# Patient Record
(demographics unavailable — no encounter records)

---

## 2024-10-15 NOTE — PHYSICAL EXAM
[General Appearance - Alert] : alert [General Appearance - In No Acute Distress] : in no acute distress [Oriented To Time, Place, And Person] : oriented to person, place, and time [Mood] : the mood was normal [Person] : oriented to person [Place] : oriented to place [Time] : oriented to time [Registration Intact] : recent registration memory intact [Concentration Intact] : normal concentrating ability [Naming Objects] : no difficulty naming common objects [Repeating Phrases] : no difficulty repeating a phrase [Fluency] : fluency intact [Comprehension] : comprehension intact [Cranial Nerves Optic (II)] : visual acuity intact bilaterally,  visual fields full to confrontation, pupils equal round and reactive to light [Cranial Nerves Oculomotor (III)] : extraocular motion intact [Cranial Nerves Trigeminal (V)] : facial sensation intact symmetrically [Cranial Nerves Facial (VII)] : face symmetrical [Cranial Nerves Vestibulocochlear (VIII)] : hearing was intact bilaterally [Cranial Nerves Glossopharyngeal (IX)] : tongue and palate midline [Cranial Nerves Accessory (XI - Cranial And Spinal)] : head turning and shoulder shrug symmetric [Cranial Nerves Hypoglossal (XII)] : there was no tongue deviation with protrusion [Motor Strength] : muscle strength was normal in all four extremities [Pain / Temp Decrease Distal Extremities (Glove & Stocking)] : diminished stocking/glove distribution [Vibration Decrease Leg / Foot Both Ankles] : decreased at both ankles [Vibration Decrease Leg / Foot Toes Both Feet] : decreased at the toes of both feet  [2+] : Brachioradialis left 2+ [1+] : Patella left 1+ [0] : Ankle jerk left 0 [Coordination - Dysmetria Impaired Finger-to-Nose Bilateral] : not present [Plantar Reflex Right Only] : normal on the right [Plantar Reflex Left Only] : normal on the left [FreeTextEntry1] : Facial expression was mildly reduced but no hypomimia, EOMI - normal saccades, smooth pursuit.  Tone mildly increased in upper > lower extremities. no bradykinesias, Finger tapping, ANGELA foot tap normal.    Able to pull up from the chair without using his arms.  Gait broadbased with decreased right arm swing  Posture was mildly stooped. .  He had mild bilateral postural and intention tremor of his hands. Slight tremor of both thighs while standing, resolves as soon as he started walking. [FreeTextEntry7] : Romberg: sway without falling [FreeTextEntry8] : Balance: Stooped posture, tilts to left, decreased left shoulder slope, unable to walk on heels and tandem.

## 2024-10-15 NOTE — DISCUSSION/SUMMARY
[FreeTextEntry1] : 80-year-old M with PMH of HTN, HLD, DVT/PE x 2, is on Coumadin; was diagnosed with peripheral neuropathy ~ two decades ago by Dr. Rascon at [facility], Rx with IVIG X 1, is on gabapentin.   Pt has intermittent tremors of hands for almost two decades, he has been experiencing shakiness tremors of legs when he stands for a period of time, lately his posture is hunched.  #1) Possibility of orthostatic tremor / neuropathic tremors; effecting lower and upper extremities.   #2) Evolution of mild parkinsonian features; evolving to  postural instability - at present exam not c/w PD -  possible Parkinsonian syndrome   - I have recommended restart physical therapy twice weekly - Discussed the option of getting DatScan of brain, would not  as per  movement disorder specialist  #3) Peripheral sensorimotor polyneuropathy; primarily axonal, mild to moderate progression of neuropathy noted since 8/2015, slight change in pattern from axonal to demyelination as well.  - Increase Gabapentin am dose to 300 mgs, continue, 200 mg noon, continue 300 mg q.p.m. - have also advised the patient to take B12 supplements. - Will consider repeating NCV studies in a year to see evolution of neuropathy

## 2024-10-15 NOTE — REVIEW OF SYSTEMS
[As Noted in HPI] : as noted in HPI [Poor Coordination] : poor coordination [Negative] : Heme/Lymph [Feeling Tired] : not feeling tired [Recent Weight Gain (___ Lbs)] : no recent weight gain [Numbness] : numbness [Tingling] : tingling [de-identified] : Postural instability, hunched posture, tremors of hands

## 2024-10-15 NOTE — DATA REVIEWED
[de-identified] : 8/11/22: EMG/NCV studies of right UE/LE's. The electrophysiological findings are consistent with moderate distal sensorimotor motor polyneuropathy, primarily axonal type.  Compared with prior studies of 8/27/2015 mild-moderate progression of neuropathy is noted, in addition to axonal loss, slight demyelinating pattern is noted as well. In addition, studies raise possibility of right L4/5 radiculopathy of chronic nature. Needle EMG studies of lumbar paraspinal muscles were deferred as patient is on warfarin.\par  8/27/15: EMG/NCV studies of right upper and lower extremities\par  This study is abnormal. The electrophysiological findings are consistent with mild to moderate peripheral sensorimotor neuropathy, primarily axonal type.\par  8/27/15: EMG/NCV studies of right upper and lower extremities\par  This study is abnormal. The electrophysiological findings are consistent with mild to moderate peripheral sensorimotor neuropathy, primarily axonal type. [de-identified] : labs: 7/29/15; immunofixation; IgG kappa Monoclonal protein present. Homocystine: 13.6, B12 378\par  TSH, CPK, ESR, ANA PAULA, SS A.-SSB antibodies, RF, CRP, Lyme titers, MMA, ACE, anti-HU antibodies unremarkable

## 2024-10-15 NOTE — HISTORY OF PRESENT ILLNESS
[FreeTextEntry1] : Pt is here for a follow up, last seen on 3/12/24. Patient reports that over the past 3-4 months, he has noted worsening numbness in the feet, in addition he reports tightness and heaviness in the legs when he is standing and walking, he feels unsteady on his feet, but he does report that sometimes his leg symptoms improve when he walks.  He continues to walk with a slightly hunched posture, and wife has noticed that he is tilting to his left side.  He reports he is active, but wife reports he is more sedentary.  Patient had been attending physical therapy, had helped him tremendously with posture and gait.  Patient has remained stable in regard to his tremors, tremors of hands are minimal..  He continues to take gabapentin 200 Mg in AM, 200 Mg and noon and 300 Mg at bedtime.

## 2024-10-15 NOTE — REASON FOR VISIT
[Follow-Up: _____] : a [unfilled] follow-up visit [FreeTextEntry1] : follow-up for Unsteady gait, tremors and neuropathy

## 2025-01-03 NOTE — HISTORY OF PRESENT ILLNESS
[FreeTextEntry1] : The patient is a 80-year-old right-handed man who has had tremor of his legs for many years.    1. Tremors happen when he is standing and go away when he walks.  He does not have any other tremors, particularly no resting tremor of the hands.  While he is standing, he feels that the tremor affects his balance and is afraid of falling.  Stress or fatigue worsen the symptoms. Has fallen in 2022 2. There is no change in his voice, and no changes in facial expression.  He does have mild drooling, but no dysphagia.  He has no trouble turning over in bed.  His handwriting is gotten worse but other activities daily and is unaffected.  Also does have intermittent mild tremor of his hands which he has had for a long time. 3. His memory is good and he has no depression or anxiety.  He has no hallucinations. No RBD. He does not have constipation, orthostasis, or urinary issues.   4. His father had a similar tremor, but there is no family history of Parkinson's disease.  For a while he had been given clonazepam at night, which might help a little bit. 5. Tried levodopa, did not tolerate, and clonazepam caused instablity.  6. He does also have a history of neuropathy which was diagnosed with extensive work-up 20/20 years ago over 20 years ago.  He has been taking gabapentin for this, and it was restarted for the tremor (200-200-300).  At the time he was also told he had essential tremor of his hands, and no medications were prescribed for that.   7. He is on blood thinners because he has a prothrombin mutation.

## 2025-01-03 NOTE — PHYSICAL EXAM
[Person] : oriented to person [Place] : oriented to place [Time] : oriented to time [Concentration Intact] : normal concentrating ability [Naming Objects] : no difficulty naming common objects [Repeating Phrases] : no difficulty repeating a phrase [Fluency] : fluency intact [Comprehension] : comprehension intact [Past History] : adequate knowledge of personal past history [Cranial Nerves Optic (II)] : visual acuity intact bilaterally,  visual fields full to confrontation, pupils equal round and reactive to light [Cranial Nerves Oculomotor (III)] : extraocular motion intact [Cranial Nerves Trigeminal (V)] : facial sensation intact symmetrically [Cranial Nerves Facial (VII)] : face symmetrical [Cranial Nerves Vestibulocochlear (VIII)] : hearing was intact bilaterally [Cranial Nerves Glossopharyngeal (IX)] : tongue and palate midline [Cranial Nerves Accessory (XI - Cranial And Spinal)] : head turning and shoulder shrug symmetric [Motor Strength] : muscle strength was normal in all four extremities [Pain / Temp Decrease Distal Extremities (Glove & Stocking)] : diminished stocking/glove distribution [Vibration Decrease Leg / Foot Both Ankles] : decreased at both ankles [Vibration Decrease Leg / Foot Toes Both Feet] : decreased at the toes of both feet  [2+] : Brachioradialis left 2+ [1+] : Patella left 1+ [0] : Ankle jerk left 0 [Short Term Intact] : short term memory intact [Coordination - Dysmetria Impaired Finger-to-Nose Bilateral] : not present [Plantar Reflex Right Only] : normal on the right [Plantar Reflex Left Only] : normal on the left [FreeTextEntry1] : Facial expression was mildly reduced but voice was normal.  Extraocular movements were intact with normal saccades, smooth pursuit, no square wave jerks seen.  Tone was normal in the neck and the legs.  Tone was maybe mildly increased in bilateral upper extremities without clear asymmetry. Rapid alternating movements and foot tap were normal.   He got up from the chair without using his arms.  Gait was normal based and steady with decreased right arm swing and reduced stride.  Posture stooped.  Pull test was negative.  He had minimal bilateral postural and intention tremor of his hands.  When standing he developed a visible high-frequency tremor of both thighs.  This resolved as soon as he started walking. [FreeTextEntry7] : Romberg: sway without falling [FreeTextEntry8] : Balance: Unable to walk on heels and tandem.

## 2025-01-03 NOTE — DISCUSSION/SUMMARY
[FreeTextEntry1] : In summary, the patient is a 80-year-old right-handed man with a several year history of leg tremors when he is standing as well as a longstanding history of bilateral hand tremors.  The examination was significant for a high-frequency tremor when he was standing which resolved on walking.  As well as a mild bilateral postural and kinetic tremor. Overall, the history and examination is probably most consistent with orthostatic tremor but is milder essential tremor.  He had no clear asymmetric parkinsonism except for his posture.  Dopaminergic imaging had been discussed, but in my judgment would not  right now, and he has not developed significant more parkinsonian symptoms except posture and gair.   1. As for treatment of the orthostatic tremor, continue gabapentin at 200-200-300, did not tolerate higher 2. Tried LD and clonazepam, did not tolerate.  3. PT 4. If sialorrhea gets worse, can use BTX,  We discussed the above impression, plan and recommendations during the visit. Counseling represented more then 50% of the 30 minute visit time

## 2025-01-03 NOTE — REVIEW OF SYSTEMS
[As Noted in HPI] : as noted in HPI [Poor Coordination] : poor coordination [Negative] : Heme/Lymph [Feeling Tired] : not feeling tired [Recent Weight Gain (___ Lbs)] : no recent weight gain [Difficulty Walking] : no difficulty walking [de-identified] : tremors of hands; Hunched posture

## 2025-01-17 NOTE — PLAN
[FreeTextEntry1] :    #HTN, well controlled Continue Losartan and Nebivolol  #LE Edema Daily weights check pro-BNP DASH diet elevate legs f/u with cardiology  #Anemia Check CBC, CMP, Iron studies, B12, and folate  #Supratherapeutic INR #PE/DVT  Hold Warfarin x 48 hours INR checked today f/u with Dr Weber    #Abdominal Pain Stop Xifaxin F/U with GI

## 2025-01-17 NOTE — PHYSICAL EXAM
[No Acute Distress] : no acute distress [Normal Sclera/Conjunctiva] : normal sclera/conjunctiva [EOMI] : extraocular movements intact [Normal Outer Ear/Nose] : the outer ears and nose were normal in appearance [Normal TMs] : both tympanic membranes were normal [No JVD] : no jugular venous distention [No Lymphadenopathy] : no lymphadenopathy [Supple] : supple [No Respiratory Distress] : no respiratory distress  [No Accessory Muscle Use] : no accessory muscle use [Clear to Auscultation] : lungs were clear to auscultation bilaterally [Normal Rate] : normal rate  [Regular Rhythm] : with a regular rhythm [Normal S1, S2] : normal S1 and S2 [Soft] : abdomen soft [Normal Anterior Cervical Nodes] : no anterior cervical lymphadenopathy [No Joint Swelling] : no joint swelling [No Rash] : no rash [Coordination Grossly Intact] : coordination grossly intact [No Focal Deficits] : no focal deficits [Normal Gait] : normal gait [Normal Affect] : the affect was normal [Alert and Oriented x3] : oriented to person, place, and time [Normal Insight/Judgement] : insight and judgment were intact [de-identified] : bilateral LE edema R>L +2

## 2025-01-17 NOTE — HISTORY OF PRESENT ILLNESS
[FreeTextEntry1] : F/U ED visit for supratherapeutic INR, bleeding  HTN [de-identified] : 80M w/ PMHx of CAD, SSS w/ PPM in situ, PE/DVT on Warfarin with prothrombin gene mutation, anemia, BPH, HLD, HTN, GERD, Asthma, and Parkinsonism presents to the office accompanied by his wife for ED follow up. Pt seen at  ED yesterday after noticing bleeding from bilateral lower legs. INR 3.42. H&H 12.8/38.1. Hypertensive to 172/92 while in ED. CXR negative, no s/s of infection. ED MD contacted associate of patient's cardiologist, Dr. Weber who recommended holding Warfarin (7mg daily) x 2 days with OP f/u.   Additionally, patient has been experiencing upper abdominal pain, he is being worked up for possible intestinal bacterial overgrowth and was started on Xifaxan 2 days prior to above episode.   Pt had bilateral lower extremity edema, admits to increased sodium intake over the last few days. The skin is dry, but there are no cracks or openings in the skin. No s/s of cellulitis. Pt denies bleeding gums, hematuria, or blood in the stool. He does bruise easily.

## 2025-01-17 NOTE — REVIEW OF SYSTEMS
[Vision Problems] : vision problems [Hearing Loss] : hearing loss [Lower Ext Edema] : lower extremity edema [Heartburn] : heartburn [Dysuria] : no dysuria [Hematuria] : no hematuria [Joint Pain] : no joint pain [Back Pain] : no back pain [Itching] : no itching [Skin Rash] : no skin rash [Headache] : no headache [Dizziness] : no dizziness [Unsteady Walk] : ataxia [Memory Loss] : no memory loss [Easy Bleeding] : easy bleeding [Easy Bruising] : easy bruising [Swollen Glands] : no swollen glands [Negative] : Gastrointestinal

## 2025-03-31 NOTE — PHYSICAL EXAM
[General Appearance - Alert] : alert [General Appearance - In No Acute Distress] : in no acute distress [General Appearance - Well Developed] : well developed [Outer Ear] : the ears and nose were normal in appearance [Hearing Threshold Finger Rub Not [place]] : hearing was normal [Examination Of The Oral Cavity] : the lips and gums were normal [Oropharynx] : the oropharynx was normal [Neck Appearance] : the appearance of the neck was normal [Neck Cervical Mass (___cm)] : no neck mass was observed [Jugular Venous Distention Increased] : there was no jugular-venous distention [Respiration, Rhythm And Depth] : normal respiratory rhythm and effort [Exaggerated Use Of Accessory Muscles For Inspiration] : no accessory muscle use [Auscultation Breath Sounds / Voice Sounds] : lungs were clear to auscultation bilaterally [Heart Rate And Rhythm] : heart rate was normal and rhythm regular [Heart Sounds] : normal S1 and S2 [Heart Sounds Pericardial Friction Rub] : no pericardial rub [Systolic grade ___/6] : A grade [unfilled]/6 systolic murmur was heard. [Abdomen Soft] : soft [Cervical Lymph Nodes Enlarged Anterior Bilaterally] : anterior cervical [Supraclavicular Lymph Nodes Enlarged Bilaterally] : supraclavicular [Skin Color & Pigmentation] : normal skin color and pigmentation [Skin Turgor] : normal skin turgor [] : no rash [Coordination Grossly Intact] : coordination grossly intact [Normal Gait] : normal gait [Normal Affect] : the affect was normal [Normal Insight/Judgement] : insight and judgment were intact [FreeTextEntry1] : obese

## 2025-03-31 NOTE — PLAN
[FreeTextEntry1] : 1. Continue current medications as outlined above.   2. Follow up in 6 months with wellness and routine fasting bloodwork.    3. Continue to follow with cardiology, Dr. Weber, for management of HLD, HTN, prothrombin gene mutation, and peripheral edema.    4. The Influenza and COVID vaccines are recommended in the fall.   5. Cardiovascular exercise as tolerated.

## 2025-03-31 NOTE — ADDENDUM
[FreeTextEntry1] : This note was written by Karen Zuñiga on 03/31/2025 acting as a medical scribe for Dr. Justin Olson MD. All medical entries made by the scribe were at my, Dr. Justin Olson's, direction and personally dictated by me on 03/31/2025. I have reviewed the chart and agree that the record accurately reflects my personal performance of the history, review of systems, assessment, and plan. I have also personally directed, reviewed, and agreed with the chart.

## 2025-03-31 NOTE — HISTORY OF PRESENT ILLNESS
[FreeTextEntry1] :  The patient comes in for a routine follow-up evaluation. [de-identified] : The patient is feeling well at this time. The patient has a history of HTN for which he is maintained on Losartan 50 MG once daily and Nebivolol 10 MG once daily. He also has a history of Hyperlipidemia and continues to take Rosuvastatin 20 MG once daily. He is generally tolerating the statin agent well, denying any severe muscle aches or any other overt symptoms. Of note, the patient was seen at  ED on 1/16/25 after noticing bleeding from his lower legs. The workup at the ED was fairly unremarkable, and his cardiologist was contacted. Dr. Weber recommended holding the Warfarin. He was then switched to Eliquis 5 MG BID for lifelong anticoagulation due to a history of the prothrombin gene mutation. He is also maintained on Furosemide 40 MG once daily and Potassium Chloride 10 MEQ once daily for treatment of peripheral edema.  Since starting this medication, his edema has diminished.  He is also attempting to wear compression stockings.  The patient continues to follow with cardiologist, Dr. Weber.   The patient has a history of GERD for which he is maintained on Famotidine 40 MG once nocturnally and pantoprazole 40 mg twice a day. He denies any recent reflux or related symptomology.  He does have a history of mild intermittent asthma, though he is not maintained on any medicatoins at this time.  There has been no cough, sputum production, or wheeze. There have been no recent exacerbations of the patient's asthma. He has not required the use of a rescue inhaler.   He is up to date on his vaccines. There have been no fevers, chills, or night sweats. There have been no other acute constitutional symptoms. He comes in for this assessment.

## 2025-03-31 NOTE — DATA REVIEWED
[FreeTextEntry1] : A pulmonary function test is performed.  Lung volumes are within normal limits except for slight decrease in the FRC.  Lung mechanics are within normal limits.  Slight bronchodilator reactivity is demonstrated.  The DLCO and saturation are maintained.  This represents normal flow rates with slight airway reactivity.  The slight decrease in the FRC may be due to his mild centripetal obesity.  Blood work performed by his cardiologist is reviewed.

## 2025-04-30 NOTE — DATA REVIEWED
[de-identified] : 8/11/22: EMG/NCV studies of right UE/LE's. The electrophysiological findings are consistent with moderate distal sensorimotor motor polyneuropathy, primarily axonal type.  Compared with prior studies of 8/27/2015 mild-moderate progression of neuropathy is noted, in addition to axonal loss, slight demyelinating pattern is noted as well. In addition, studies raise possibility of right L4/5 radiculopathy of chronic nature. Needle EMG studies of lumbar paraspinal muscles were deferred as patient is on warfarin.\par  8/27/15: EMG/NCV studies of right upper and lower extremities\par  This study is abnormal. The electrophysiological findings are consistent with mild to moderate peripheral sensorimotor neuropathy, primarily axonal type.\par  8/27/15: EMG/NCV studies of right upper and lower extremities\par  This study is abnormal. The electrophysiological findings are consistent with mild to moderate peripheral sensorimotor neuropathy, primarily axonal type. [de-identified] : labs: 7/29/15; immunofixation; IgG kappa Monoclonal protein present. Homocystine: 13.6, B12 378\par  TSH, CPK, ESR, ANA PAULA, SS A.-SSB antibodies, RF, CRP, Lyme titers, MMA, ACE, anti-HU antibodies unremarkable

## 2025-04-30 NOTE — HISTORY OF PRESENT ILLNESS
[FreeTextEntry1] : Pt is here for a follow up, last seen on 10/15/24. Patient reports he continues to have numbness in the feet, in addition he reports tightness and heaviness in the legs when he is standing and walking, he feels unsteady on his feet, but he does report that sometimes his leg symptoms improve when he walks.  He continues to walk with a slightly hunched posture, and wife has noticed that he is tilting to his left side.  He reports he is active, but wife reports he is sedentary.  Patient has remained stable in regard to his tremors, tremors of hands are minimal..  He continues to take gabapentin 200 Mg in AM, 200 Mg and noon and 300 Mg at bedtime.

## 2025-04-30 NOTE — REVIEW OF SYSTEMS
[As Noted in HPI] : as noted in HPI [Poor Coordination] : poor coordination [Numbness] : numbness [Tingling] : tingling [Negative] : Heme/Lymph [Feeling Tired] : not feeling tired [Recent Weight Gain (___ Lbs)] : no recent weight gain [de-identified] : Postural instability, hunched posture, tremors of hands

## 2025-04-30 NOTE — DISCUSSION/SUMMARY
[FreeTextEntry1] : 81-year-old M with PMH of HTN, HLD, DVT/PE x 2, is on Coumadin; was diagnosed with peripheral neuropathy ~ two decades ago by Dr. Rascon at [facility], Rx with IVIG X 1, is on gabapentin.   Pt has intermittent tremors of hands for almost two decades, he has been experiencing shakiness tremors of legs when he stands for a period of time, lately his posture is hunched.  #1) Possibility of orthostatic tremor / neuropathic tremors; effecting lower and upper extremities.   #2) Evolution of mild parkinsonian features; evolving to  postural instability - at present exam not c/w PD -  possible Parkinsonian syndrome   - I have recommended restart physical therapy twice weekly - Discussed the option of getting DatScan of brain, would not  as per  movement disorder specialist  #3) Peripheral sensorimotor polyneuropathy; primarily axonal, mild to moderate progression of neuropathy noted since 8/2015, slight change in pattern from axonal to demyelination as well.  - continue Gabapentin 200 mgs in am, 200 mg noon, continue 300 mg q.p.m. - have also advised the patient to take B12 supplements. - Will consider repeating NCV studies in a year to see evolution of neuropathy

## 2025-04-30 NOTE — REASON FOR VISIT
[Follow-Up: _____] : a [unfilled] follow-up visit [Spouse] : spouse [FreeTextEntry1] : follow-up for tremors and neuropathy